# Patient Record
(demographics unavailable — no encounter records)

---

## 2024-10-16 NOTE — PHYSICAL EXAM
[No Acute Distress] : no acute distress [Well Nourished] : well nourished [Normal] : no acute distress, well nourished, well developed and well-appearing

## 2024-10-16 NOTE — HISTORY OF PRESENT ILLNESS
[FreeTextEntry1] : Medicare Wellness [de-identified] : Has been doing well; walking every day and doing training with  twice a week at home. Has lost 40+ pounds voluntarily.

## 2024-10-16 NOTE — REASON FOR VISIT
[Home] : at home, [unfilled] , at the time of the visit. [Medical Office: ([facility])___] : at the medical office located in  [Patient] : the patient [Annual Wellness Visit] : an annual wellness visit

## 2024-10-16 NOTE — HEALTH RISK ASSESSMENT
[Very Good] : ~his/her~  mood as very good [Never (0 pts)] : Never (0 points) [No] : In the past 12 months have you used drugs other than those required for medical reasons? No [Any fall with injury in past year] : Patient reported fall with injury in the past year [0] : 2) Feeling down, depressed, or hopeless: Not at all (0) [PHQ-2 Negative - No further assessment needed] : PHQ-2 Negative - No further assessment needed [Former] : Former [10-14] : 10-14 [> 15 Years] : > 15 Years [NO] : No [Patient reported PAP Smear was normal] : Patient reported PAP Smear was normal [Alone] : lives alone [Retired] : retired [Graduate School] : graduate school [] :  [# Of Children ___] : has [unfilled] children [Feels Safe at Home] : Feels safe at home [Fully functional (bathing, dressing, toileting, transferring, walking, feeding)] : Fully functional (bathing, dressing, toileting, transferring, walking, feeding) [Fully functional (using the telephone, shopping, preparing meals, housekeeping, doing laundry, using] : Fully functional and needs no help or supervision to perform IADLs (using the telephone, shopping, preparing meals, housekeeping, doing laundry, using transportation, managing medications and managing finances) [Smoke Detector] : smoke detector [Carbon Monoxide Detector] : carbon monoxide detector [Seat Belt] :  uses seat belt [Sunscreen] : uses sunscreen [Intercurrent ED visits] : went to ED [FreeTextEntry1] : losing weight, controlling kidneys and potassium [de-identified] : wrist fracture 1/11/24 [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

## 2024-11-11 NOTE — HISTORY OF PRESENT ILLNESS
[FreeTextEntry1] : skin check and lesion on right arm [de-identified] : 76 y/o female presents for skin check and lesion on right arm. Patient reports no personal history of skin cancer. Brother had skin cancer-unsure of which kind. skin lesion on R forearm irritated

## 2024-11-11 NOTE — HISTORY OF PRESENT ILLNESS
[FreeTextEntry1] : skin check and lesion on right arm [de-identified] : 74 y/o female presents for skin check and lesion on right arm. Patient reports no personal history of skin cancer. Brother had skin cancer-unsure of which kind. skin lesion on R forearm irritated

## 2024-11-11 NOTE — PHYSICAL EXAM
[Alert] : alert [Oriented x 3] : ~L oriented x 3 [Full Body Skin Exam Performed] : performed [FreeTextEntry3] : PE:   General: well-appearing, alert, in no acute distress  Full body skin exam performed examining scalp, head, face, ears, eyes, mouth, neck, chest, back, abdomen, axilla, b/l arms, b/l forearms, b/l hands, b/l fingernails, b/l thighs, b/l legs, b/l feet, b/l toenails Pertinent findings include: -scattered light brown to dark brown colored <6mm papules and macules on the trunk and extremities -brown stuck on papules, plaques on the trunk and extremities

## 2024-11-18 NOTE — HISTORY OF PRESENT ILLNESS
[No falls in past year] : Patient reported no falls in the past year [Completely Independent] : Completely independent. [Smoke Detector] : smoke detector [Carbon Monoxide Detector] : carbon monoxide detector [NO] : No [0] : 2) Feeling down, depressed, or hopeless: Not at all (0) [PHQ-2 Negative - No further assessment needed] : PHQ-2 Negative - No further assessment needed [FreeTextEntry1] : 75F PMH HLD, DM, CKD, Blepharospasm s/p quarterly injections of botox - Dr Franny Herrera ([facility])  presenting for follow up. PCP was Dr. Moise ([facility]) [place].  She was found to have HTN, HLD, DM, CKD.  She has lost almost 70lbs at this time and is no longer on antihypertensives. She has had all cancer and health maintenance screenings per her knowledge.  She does have osteopenia.  Struggling to keep her weight down Brought in food log Now to see new gastroenterologist will need repeat colonoscopy due to villous polyp  ultimate goal is to get off of medications  Vitreous Detachment right eye May 2024 - Dr Brian Tillman  Having surveillance     [[id number]] : 0

## 2024-11-18 NOTE — REVIEW OF SYSTEMS
[Fever] : no fever [Chills] : no chills [Eye Pain] : no eye pain [Red Eyes] : eyes not red [Earache] : no earache [Loss Of Hearing] : no hearing loss [Chest Pain] : no chest pain [Palpitations] : no palpitations [Cough] : no cough [SOB on Exertion] : no shortness of breath during exertion [Abdominal Pain] : no abdominal pain [Vomiting] : no vomiting [Skin Lesions] : no skin lesions [Skin Wound] : no skin wound [Dizziness] : no dizziness [Fainting] : no fainting [Change In Personality] : no personality change [Emotional Problems] : no emotional problems

## 2024-11-18 NOTE — PHYSICAL EXAM
[Alert] : alert [Well Nourished] : well nourished [Sclera] : the sclera and conjunctiva were normal [EOMI] : extraocular movements were intact [Normal Oral Mucosa] : normal oral mucosa [No Oral Pallor] : no oral pallor [Normal Appearance] : the appearance of the neck was normal [Supple] : the neck was supple [No Respiratory Distress] : no respiratory distress [No Acc Muscle Use] : no accessory muscle use [Auscultation Breath Sounds / Voice Sounds] : lungs were clear to auscultation bilaterally [Respiration, Rhythm And Depth] : normal respiratory rhythm and effort [Normal S1, S2] : normal S1 and S2 [Heart Rate And Rhythm] : heart rate was normal and rhythm regular [Bowel Sounds] : normal bowel sounds [Abdomen Soft] : soft [Abdomen Tenderness] : non-tender [Cervical Lymph Nodes Enlarged Posterior Bilaterally] : posterior cervical [Supraclavicular Lymph Nodes Enlarged Bilaterally] : supraclavicular [Cervical Lymph Nodes Enlarged Anterior Bilaterally] : anterior cervical, supraclavicular [Normal Color / Pigmentation] : normal skin color and pigmentation [Normal Turgor] : normal skin turgor [Normal Affect] : the affect was normal [Normal Mood] : the mood was normal

## 2024-12-03 NOTE — PHYSICAL EXAM
[Alert] : alert [Well Developed] : well developed [Sclera] : the sclera and conjunctiva were normal [Hearing Threshold Finger Rub Not [place]] : hearing was normal [Normal Appearance] : the appearance of the neck was normal [No Respiratory Distress] : no respiratory distress [Heart Rate And Rhythm] : heart rate was normal and rhythm regular [Bowel Sounds] : normal bowel sounds [Abdomen Tenderness] : non-tender [No Masses] : no abdominal mass palpated [Abdomen Soft] : soft [] : no hepatosplenomegaly [No Focal Deficits] : no focal deficits [Oriented To Time, Place, And Person] : oriented to person, place, and time

## 2024-12-03 NOTE — HISTORY OF PRESENT ILLNESS
[FreeTextEntry1] : Ms. Monroe is a 76yo F with PMH of DM2, CKD who presents for establishment of care.  Patient underwent her first colonoscopy 2021 at [facility]. Colonoscopy showed diverticulosis and four 1-1.5cm polyps (2 inflammatory polyps, 2 TAs) and was recommended a repeat colonoscopy in 3 years. Prep was fair (on Sutab). No family history of colorectal cancer or IBD. No blood in stools. No unintentional weight loss. No change in bowel habits or stool consistency. Having regular bowel movement every 1-2 days. No abdominal pain. No nausea, vomiting, early satiety. No dysphagia or odynophagia. No heartburn or reflux. No melena. Patient reports previous constipation while on metformin. Since switching to Farxiga she is having daily BMs.

## 2024-12-03 NOTE — ASSESSMENT
[FreeTextEntry1] : # Colonic polyps - history of two ~1cm TAs on previous colonoscopy. Noted fair prep on Sutab. Plan for repeat colonoscopy. The risks, benefits and alternatives of the procedure were discussed with the patient in detail. Risks include bleeding, infection, bowel perforation, adverse reaction to sedation and missed lesions. All questions were answered. The patient expressed understanding of these risks and is agreeable to proceed. Plan for MiraLAX prep (given CKD, Sutab is not recommended). Patient will take daily MiraLAX the week prior to procedure to hopefully improve prep quality.  Plan: - Hold Farxiga 5d prior to procedure - MiraLAX daily the week prior to procedure - MiraLAX prep sent - Colonoscopy - RTC in 2 months

## 2025-01-08 NOTE — HISTORY OF PRESENT ILLNESS
[FreeTextEntry1] : 76 yo female here for f/u for HTN, CKD. DM  and MARCOS -   1/2025 here for f/u CKD, HTN RTA - takes sps for hyperkalemia - RTA hyponatremia struggling with weight - has , seen by dietician  6/2024 here for f/u HTN, CKD was stressed re her brothers health his health is improved - she's less anxious RTA - takes sps for hyperkalemia twice a week hyponatremia BP in 12/2023 was 180 systolic and when rechecked was 142 systolic - has been on low dose amlodipine since 1/2024, BP at goal BMET, PTH, uric acid reviewed  3/2024 here for f/u CKD, HTN after losing 40lb+  no longer required CPAP, MARCOS appears to have resolved HTN had improved but coping with great deal of  stress,, still upset about brother that was recently hospitalized and underwent emergency surgery, bowel perf, colostomy - fell and # L wrist, started on amlodipine by PCP (2.5mg), BP improved but not at goal - RTA - takes sps for hyperkalemia - RTA hyponatremia BP in 12/2023 was 180 systolic and when rechecked was 142 systolic  8/2023 here for f/u repeated sleep study, no longer needing/using CPAP lost nearly 40lbs BP well controlled metformin reduced to once a day takes SPSP twice weekly doesnt qualify for SGLT2  2/2023 here for f/u repeated sleep study, no longer needing/using CPAP lost nearly 40lbs over past year BP well controlled metformin reduced to once a day takes SPSP twice weekly consider SGLT2  10/2022 here for f/u went to [facility] repeated sleep study, no longer needing/using CPAP lost nearly 40lbs over past year BP well controlled may not need losartan may not need metformin consider SGLT2  5/2022 - here for f/u - seen by pulm, using CPAP, has lost sig amount of weight, will repeat sleep study as she may no longer req CPAP - BP well controlled - s/p x country drive   1/27/22 - started CPAP, feels much better, lost 70 lbs since 2/2020 - had elevated potassium in past, is constipated   on ccb and losartan - feels better - most recent labs show cr 1.4 ( peaked at 1.7)   From initial visit: - was  referred for eval of an elevated cr noted on routine labs done when she went to an Urgent Care for dizzyness - found to have an elevated cr, elevated BS, proteinuria and HTN  - was advised to see PCP ( Dr. Dennis - [facility]); started on metformin, atorvastatin, amlodipine and losartan 1st week of February - Labs accompanying her show cr stable ~1.45  and K 5.7 3/8 - was advised to reduce dietary K -Also describes severe snoring, poor sleep    FHx: no DM, no CKD +ve gout SH:  No EtOH no tobacco OHx: retired teacher

## 2025-01-08 NOTE — ASSESSMENT
[FreeTextEntry1] : CKD 3 - 2/2 DM, HTN, obesity and MARCOS - has lost ~40lbs - off metformin - has CKD 3 likely 2/2/ aforementioned ( A1c 8--> 5.7--> 5.1--> 5.4%) - no longer on arb, (25mg daily) - confirmed MARCOS, was tested and confirmed- now on CPAP, sleep study reviewed - no longer needs it - reviewed CMET, lipids, UPC - cont farxiga  Hyperkalemia - 2/2 RTA, constipation and arb, d/c arb, is taking SPS - wants to try going off SPS and try with diet alone, cant take HCTZ 2/2 hyponatremia  Obesity - lost weight (intentional) 70lbs  HTN - BP at goal - cont amlodipine to 2.5mg  Hyperkalemia - hyperkalemia, likely 2/2 RTA,  persistent despite cessation of arb, on SPS 2x/week, wants to try controlling with diet alone  Constipation - resolved with cessation of metformin  Obesity - counseled on diet, should improve with CPAP - lost 70lbs (intentional)  DM - f/u w/Dr. ELLSWORTH - A1c 5.9 9/2021--> 5.1 - per pt no longer diabetic  Osteopenia - bone scan reviewed - on D3 per PCP - vid d2 and d3 lvls normal, PTH  normal  BPV - referred to PT, no longer occurring  discussed management of risk factors for disease progression, including excercise, controlling DM, high cholesterol, avoiding NSAIDs, staying hydrated -

## 2025-01-08 NOTE — PHYSICAL EXAM
[General Appearance - In No Acute Distress] : in no acute distress [General Appearance - Alert] : alert [Sclera] : the sclera and conjunctiva were normal [Extraocular Movements] : extraocular movements were intact [Outer Ear] : the ears and nose were normal in appearance [Hearing Threshold Finger Rub Not [place]] : hearing was normal [Neck Appearance] : the appearance of the neck was normal [] : no respiratory distress [Exaggerated Use Of Accessory Muscles For Inspiration] : no accessory muscle use [Apical Impulse] : the apical impulse was normal [Heart Sounds] : normal S1 and S2 [Edema] : there was no peripheral edema [Veins - Varicosity Changes] : there were no varicosital changes [Bowel Sounds] : normal bowel sounds [Abdomen Tenderness] : non-tender [No CVA Tenderness] : no ~M costovertebral angle tenderness [No Spinal Tenderness] : no spinal tenderness [Abnormal Walk] : normal gait [Musculoskeletal - Swelling] : no joint swelling seen [Cranial Nerves] : cranial nerves 2-12 were intact [No Focal Deficits] : no focal deficits [Oriented To Time, Place, And Person] : oriented to person, place, and time [Affect] : the affect was normal

## 2025-03-17 NOTE — HISTORY OF PRESENT ILLNESS
[FreeTextEntry1] : 75F PMH HLD, DM, CKD, Blepharospasm s/p quarterly injections of botox - Dr Franny Herrera ([facility])  presenting for follow up. PCP was Dr. Moise ([facility]) Fernandez.  She was found to have HTN, HLD, DM, CKD.  She has lost almost 70lbs at this time and is no longer on antihypertensives. She has had all cancer and health maintenance screenings per her knowledge.  She does have osteopenia.  Struggling to keep her weight down had colonoscopy and was advised to follow up in 3 years  ultimate goal is to get off of medications  Vitreous Detachment right eye May 2024 - Dr Brian Tillman  Having surveillance   wants to discuss medication options for weight loss     [No falls in past year] : Patient reported no falls in the past year [Completely Independent] : Completely independent. [Smoke Detector] : smoke detector [Carbon Monoxide Detector] : carbon monoxide detector [NO] : No [0] : 2) Feeling down, depressed, or hopeless: Not at all (0) [PHQ-2 Negative - No further assessment needed] : PHQ-2 Negative - No further assessment needed [[id number]] : 0

## 2025-03-17 NOTE — PHYSICAL EXAM
[Alert] : alert [Well Nourished] : well nourished [Sclera] : the sclera and conjunctiva were normal [EOMI] : extraocular movements were intact [Normal Oral Mucosa] : normal oral mucosa [No Oral Pallor] : no oral pallor [Normal Appearance] : the appearance of the neck was normal [Supple] : the neck was supple [No Respiratory Distress] : no respiratory distress [No Acc Muscle Use] : no accessory muscle use [Respiration, Rhythm And Depth] : normal respiratory rhythm and effort [Auscultation Breath Sounds / Voice Sounds] : lungs were clear to auscultation bilaterally [Normal S1, S2] : normal S1 and S2 [Heart Rate And Rhythm] : heart rate was normal and rhythm regular [Bowel Sounds] : normal bowel sounds [Abdomen Tenderness] : non-tender [Abdomen Soft] : soft [Cervical Lymph Nodes Enlarged Posterior Bilaterally] : posterior cervical [Supraclavicular Lymph Nodes Enlarged Bilaterally] : supraclavicular [Cervical Lymph Nodes Enlarged Anterior Bilaterally] : anterior cervical, supraclavicular [Normal Color / Pigmentation] : normal skin color and pigmentation [Normal Turgor] : normal skin turgor [Normal Affect] : the affect was normal [Normal Mood] : the mood was normal

## 2025-05-01 NOTE — ASSESSMENT
[FreeTextEntry1] : CKD 3 - 2/2 DM, HTN, obesity and MARCOS - now on wegovy, lost 10lbs - off metformin - has CKD 3 likely 2/2/ aforementioned ( A1c 8--> 5.7--> 5.1--> 5.4%--> 5.6) - no longer on arb, (25mg daily) - confirmed MARCOS, was tested and confirmed- now on CPAP, sleep study reviewed - no longer needs it - reviewed CMET, lipids, UPC - cont farxiga  Hyperkalemia - 2/2 RTA, constipation and arb, d/c arb, is taking SPS - wants to try going off SPS and try with diet alone, cant take HCTZ 2/2 hyponatremia  Obesity - cont Wegovy  HTN - BP at goal - cont amlodipine to 2.5mg  Hyperkalemia - hyperkalemia, likely 2/2 RTA,  persistent despite cessation of arb, on SPS 2x/week, wants to try controlling with diet alone  Constipation - resolved with cessation of metformin  DM - A1c 5.4 - per pt no longer diabetic  Osteopenia - bone scan reviewed - on D3 per PCP - vid d2 and d3 lvls normal, PTH  normal  BPV - referred to PT, no longer occurring  discussed management of risk factors for disease progression, including excercise, controlling DM, high cholesterol, avoiding NSAIDs, staying hydrated -

## 2025-05-01 NOTE — HISTORY OF PRESENT ILLNESS
[FreeTextEntry1] : 76 yo female here for f/u for HTN, CKD. DM  and MARCOS -   5/2025 - f/u CKD, HTN RTA - takes sps for hyperkalemia - RTA hyponatremia started wegovy - lost ~10lbs BP at goal  1/2025 here for f/u CKD, HTN RTA - takes sps for hyperkalemia - RTA hyponatremia struggling with weight - has , seen by dietician  6/2024 here for f/u HTN, CKD was stressed re her brothers health his health is improved - she's less anxious RTA - takes sps for hyperkalemia twice a week hyponatremia BP in 12/2023 was 180 systolic and when rechecked was 142 systolic - has been on low dose amlodipine since 1/2024, BP at goal BMET, PTH, uric acid reviewed  3/2024 here for f/u CKD, HTN after losing 40lb+  no longer required CPAP, MARCOS appears to have resolved HTN had improved but coping with great deal of  stress,, still upset about brother that was recently hospitalized and underwent emergency surgery, bowel perf, colostomy - fell and # L wrist, started on amlodipine by PCP (2.5mg), BP improved but not at goal - RTA - takes sps for hyperkalemia - RTA hyponatremia BP in 12/2023 was 180 systolic and when rechecked was 142 systolic  8/2023 here for f/u repeated sleep study, no longer needing/using CPAP lost nearly 40lbs BP well controlled metformin reduced to once a day takes SPSP twice weekly doesnt qualify for SGLT2  2/2023 here for f/u repeated sleep study, no longer needing/using CPAP lost nearly 40lbs over past year BP well controlled metformin reduced to once a day takes SPSP twice weekly consider SGLT2  10/2022 here for f/u went to [facility] repeated sleep study, no longer needing/using CPAP lost nearly 40lbs over past year BP well controlled may not need losartan may not need metformin consider SGLT2  5/2022 - here for f/u - seen by pulm, using CPAP, has lost sig amount of weight, will repeat sleep study as she may no longer req CPAP - BP well controlled - s/p x country drive   1/27/22 - started CPAP, feels much better, lost 70 lbs since 2/2020 - had elevated potassium in past, is constipated   on ccb and losartan - feels better - most recent labs show cr 1.4 ( peaked at 1.7)   From initial visit: - was  referred for eval of an elevated cr noted on routine labs done when she went to an Urgent Care for dizzyness - found to have an elevated cr, elevated BS, proteinuria and HTN  - was advised to see PCP ( Dr. 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

## 2025-05-01 NOTE — PHYSICAL EXAM
[General Appearance - Alert] : alert [General Appearance - In No Acute Distress] : in no acute distress [Sclera] : the sclera and conjunctiva were normal [Extraocular Movements] : extraocular movements were intact [Outer Ear] : the ears and nose were normal in appearance [Hearing Threshold Finger Rub Not [place]] : hearing was normal [Neck Appearance] : the appearance of the neck was normal [] : no respiratory distress [Exaggerated Use Of Accessory Muscles For Inspiration] : no accessory muscle use [Apical Impulse] : the apical impulse was normal [Heart Sounds] : normal S1 and S2 [Edema] : there was no peripheral edema [Veins - Varicosity Changes] : there were no varicosital changes [Bowel Sounds] : normal bowel sounds [Abdomen Tenderness] : non-tender [No CVA Tenderness] : no ~M costovertebral angle tenderness [No Spinal Tenderness] : no spinal tenderness [Abnormal Walk] : normal gait [Musculoskeletal - Swelling] : no joint swelling seen [Cranial Nerves] : cranial nerves 2-12 were intact [No Focal Deficits] : no focal deficits [Oriented To Time, Place, And Person] : oriented to person, place, and time [Affect] : the affect was normal